# Patient Record
Sex: FEMALE | Race: WHITE | NOT HISPANIC OR LATINO | URBAN - METROPOLITAN AREA
[De-identification: names, ages, dates, MRNs, and addresses within clinical notes are randomized per-mention and may not be internally consistent; named-entity substitution may affect disease eponyms.]

---

## 2017-10-06 ENCOUNTER — INPATIENT (INPATIENT)
Facility: HOSPITAL | Age: 82
LOS: 2 days | Discharge: HOME | DRG: 310 | End: 2017-10-09
Attending: INTERNAL MEDICINE | Admitting: INTERNAL MEDICINE
Payer: MEDICARE

## 2017-10-06 VITALS
DIASTOLIC BLOOD PRESSURE: 92 MMHG | WEIGHT: 149.91 LBS | HEIGHT: 63 IN | OXYGEN SATURATION: 96 % | HEART RATE: 131 BPM | RESPIRATION RATE: 18 BRPM | SYSTOLIC BLOOD PRESSURE: 154 MMHG | TEMPERATURE: 98 F

## 2017-10-07 DIAGNOSIS — I48.91 UNSPECIFIED ATRIAL FIBRILLATION: ICD-10-CM

## 2017-10-07 DIAGNOSIS — E78.00 PURE HYPERCHOLESTEROLEMIA, UNSPECIFIED: ICD-10-CM

## 2017-10-07 DIAGNOSIS — Z29.9 ENCOUNTER FOR PROPHYLACTIC MEASURES, UNSPECIFIED: ICD-10-CM

## 2017-10-07 DIAGNOSIS — I10 ESSENTIAL (PRIMARY) HYPERTENSION: ICD-10-CM

## 2017-10-07 LAB
ALBUMIN SERPL ELPH-MCNC: 3.7 G/DL — SIGNIFICANT CHANGE UP (ref 3.5–5)
ALP SERPL-CCNC: 88 U/L — SIGNIFICANT CHANGE UP (ref 40–120)
ALT FLD-CCNC: 29 U/L DA — SIGNIFICANT CHANGE UP (ref 10–60)
ANION GAP SERPL CALC-SCNC: 7 MMOL/L — SIGNIFICANT CHANGE UP (ref 5–17)
ANION GAP SERPL CALC-SCNC: 9 MMOL/L — SIGNIFICANT CHANGE UP (ref 5–17)
APPEARANCE UR: CLEAR — SIGNIFICANT CHANGE UP
APTT BLD: 113.7 SEC — HIGH (ref 27.5–37.4)
APTT BLD: 182.4 SEC — CRITICAL HIGH (ref 27.5–37.4)
APTT BLD: 28.8 SEC — SIGNIFICANT CHANGE UP (ref 27.5–37.4)
AST SERPL-CCNC: 20 U/L — SIGNIFICANT CHANGE UP (ref 10–40)
BASOPHILS # BLD AUTO: 0.1 K/UL — SIGNIFICANT CHANGE UP (ref 0–0.2)
BASOPHILS NFR BLD AUTO: 0.9 % — SIGNIFICANT CHANGE UP (ref 0–2)
BILIRUB SERPL-MCNC: 0.4 MG/DL — SIGNIFICANT CHANGE UP (ref 0.2–1.2)
BILIRUB UR-MCNC: NEGATIVE — SIGNIFICANT CHANGE UP
BUN SERPL-MCNC: 13 MG/DL — SIGNIFICANT CHANGE UP (ref 7–18)
BUN SERPL-MCNC: 22 MG/DL — HIGH (ref 7–18)
CALCIUM SERPL-MCNC: 8.9 MG/DL — SIGNIFICANT CHANGE UP (ref 8.4–10.5)
CALCIUM SERPL-MCNC: 9.2 MG/DL — SIGNIFICANT CHANGE UP (ref 8.4–10.5)
CHLORIDE SERPL-SCNC: 105 MMOL/L — SIGNIFICANT CHANGE UP (ref 96–108)
CHLORIDE SERPL-SCNC: 113 MMOL/L — HIGH (ref 96–108)
CHOLEST SERPL-MCNC: 197 MG/DL — SIGNIFICANT CHANGE UP (ref 10–199)
CK MB BLD-MCNC: 2 % — SIGNIFICANT CHANGE UP (ref 0–3.5)
CK MB BLD-MCNC: 2.1 % — SIGNIFICANT CHANGE UP (ref 0–3.5)
CK MB CFR SERPL CALC: 2.4 NG/ML — SIGNIFICANT CHANGE UP (ref 0–3.6)
CK MB CFR SERPL CALC: 2.6 NG/ML — SIGNIFICANT CHANGE UP (ref 0–3.6)
CK SERPL-CCNC: 114 U/L — SIGNIFICANT CHANGE UP (ref 21–215)
CK SERPL-CCNC: 132 U/L — SIGNIFICANT CHANGE UP (ref 21–215)
CO2 SERPL-SCNC: 25 MMOL/L — SIGNIFICANT CHANGE UP (ref 22–31)
CO2 SERPL-SCNC: 27 MMOL/L — SIGNIFICANT CHANGE UP (ref 22–31)
COLOR SPEC: YELLOW — SIGNIFICANT CHANGE UP
CREAT SERPL-MCNC: 0.75 MG/DL — SIGNIFICANT CHANGE UP (ref 0.5–1.3)
CREAT SERPL-MCNC: 1.13 MG/DL — SIGNIFICANT CHANGE UP (ref 0.5–1.3)
D DIMER BLD IA.RAPID-MCNC: 216 NG/ML DDU — SIGNIFICANT CHANGE UP
DIFF PNL FLD: NEGATIVE — SIGNIFICANT CHANGE UP
EOSINOPHIL # BLD AUTO: 0.1 K/UL — SIGNIFICANT CHANGE UP (ref 0–0.5)
EOSINOPHIL NFR BLD AUTO: 0.8 % — SIGNIFICANT CHANGE UP (ref 0–6)
GLUCOSE SERPL-MCNC: 110 MG/DL — HIGH (ref 70–99)
GLUCOSE SERPL-MCNC: 116 MG/DL — HIGH (ref 70–99)
GLUCOSE UR QL: NEGATIVE — SIGNIFICANT CHANGE UP
HBA1C BLD-MCNC: 5.5 % — SIGNIFICANT CHANGE UP (ref 4–5.6)
HCT VFR BLD CALC: 45.9 % — HIGH (ref 34.5–45)
HCT VFR BLD CALC: 47.2 % — HIGH (ref 34.5–45)
HCT VFR BLD CALC: 47.8 % — HIGH (ref 34.5–45)
HDLC SERPL-MCNC: 62 MG/DL — SIGNIFICANT CHANGE UP (ref 40–125)
HGB BLD-MCNC: 14.3 G/DL — SIGNIFICANT CHANGE UP (ref 11.5–15.5)
HGB BLD-MCNC: 14.7 G/DL — SIGNIFICANT CHANGE UP (ref 11.5–15.5)
HGB BLD-MCNC: 14.9 G/DL — SIGNIFICANT CHANGE UP (ref 11.5–15.5)
INR BLD: 0.94 RATIO — SIGNIFICANT CHANGE UP (ref 0.88–1.16)
KETONES UR-MCNC: NEGATIVE — SIGNIFICANT CHANGE UP
LEUKOCYTE ESTERASE UR-ACNC: ABNORMAL
LIPID PNL WITH DIRECT LDL SERPL: 116 MG/DL — SIGNIFICANT CHANGE UP
LYMPHOCYTES # BLD AUTO: 2.9 K/UL — SIGNIFICANT CHANGE UP (ref 1–3.3)
LYMPHOCYTES # BLD AUTO: 27.4 % — SIGNIFICANT CHANGE UP (ref 13–44)
MAGNESIUM SERPL-MCNC: 2.1 MG/DL — SIGNIFICANT CHANGE UP (ref 1.6–2.6)
MAGNESIUM SERPL-MCNC: 2.2 MG/DL — SIGNIFICANT CHANGE UP (ref 1.6–2.6)
MCHC RBC-ENTMCNC: 28.6 PG — SIGNIFICANT CHANGE UP (ref 27–34)
MCHC RBC-ENTMCNC: 29 PG — SIGNIFICANT CHANGE UP (ref 27–34)
MCHC RBC-ENTMCNC: 29.4 PG — SIGNIFICANT CHANGE UP (ref 27–34)
MCHC RBC-ENTMCNC: 30.8 GM/DL — LOW (ref 32–36)
MCHC RBC-ENTMCNC: 31.1 GM/DL — LOW (ref 32–36)
MCHC RBC-ENTMCNC: 31.5 GM/DL — LOW (ref 32–36)
MCV RBC AUTO: 93.1 FL — SIGNIFICANT CHANGE UP (ref 80–100)
MCV RBC AUTO: 93.1 FL — SIGNIFICANT CHANGE UP (ref 80–100)
MCV RBC AUTO: 93.4 FL — SIGNIFICANT CHANGE UP (ref 80–100)
MONOCYTES # BLD AUTO: 0.8 K/UL — SIGNIFICANT CHANGE UP (ref 0–0.9)
MONOCYTES NFR BLD AUTO: 7.5 % — SIGNIFICANT CHANGE UP (ref 2–14)
NEUTROPHILS # BLD AUTO: 6.7 K/UL — SIGNIFICANT CHANGE UP (ref 1.8–7.4)
NEUTROPHILS NFR BLD AUTO: 63.4 % — SIGNIFICANT CHANGE UP (ref 43–77)
NITRITE UR-MCNC: NEGATIVE — SIGNIFICANT CHANGE UP
NT-PROBNP SERPL-SCNC: 2829 PG/ML — HIGH (ref 0–450)
PH UR: 6.5 — SIGNIFICANT CHANGE UP (ref 5–8)
PHOSPHATE SERPL-MCNC: 2.8 MG/DL — SIGNIFICANT CHANGE UP (ref 2.5–4.5)
PHOSPHATE SERPL-MCNC: 3.3 MG/DL — SIGNIFICANT CHANGE UP (ref 2.5–4.5)
PLATELET # BLD AUTO: 239 K/UL — SIGNIFICANT CHANGE UP (ref 150–400)
PLATELET # BLD AUTO: 256 K/UL — SIGNIFICANT CHANGE UP (ref 150–400)
PLATELET # BLD AUTO: 271 K/UL — SIGNIFICANT CHANGE UP (ref 150–400)
POTASSIUM SERPL-MCNC: 4.4 MMOL/L — SIGNIFICANT CHANGE UP (ref 3.5–5.3)
POTASSIUM SERPL-MCNC: 4.5 MMOL/L — SIGNIFICANT CHANGE UP (ref 3.5–5.3)
POTASSIUM SERPL-SCNC: 4.4 MMOL/L — SIGNIFICANT CHANGE UP (ref 3.5–5.3)
POTASSIUM SERPL-SCNC: 4.5 MMOL/L — SIGNIFICANT CHANGE UP (ref 3.5–5.3)
PROT SERPL-MCNC: 7.4 G/DL — SIGNIFICANT CHANGE UP (ref 6–8.3)
PROT UR-MCNC: NEGATIVE — SIGNIFICANT CHANGE UP
PROTHROM AB SERPL-ACNC: 10.2 SEC — SIGNIFICANT CHANGE UP (ref 9.8–12.7)
RBC # BLD: 4.93 M/UL — SIGNIFICANT CHANGE UP (ref 3.8–5.2)
RBC # BLD: 5.05 M/UL — SIGNIFICANT CHANGE UP (ref 3.8–5.2)
RBC # BLD: 5.13 M/UL — SIGNIFICANT CHANGE UP (ref 3.8–5.2)
RBC # FLD: 12.3 % — SIGNIFICANT CHANGE UP (ref 10.3–14.5)
RBC # FLD: 12.5 % — SIGNIFICANT CHANGE UP (ref 10.3–14.5)
RBC # FLD: 12.5 % — SIGNIFICANT CHANGE UP (ref 10.3–14.5)
SODIUM SERPL-SCNC: 141 MMOL/L — SIGNIFICANT CHANGE UP (ref 135–145)
SODIUM SERPL-SCNC: 145 MMOL/L — SIGNIFICANT CHANGE UP (ref 135–145)
SP GR SPEC: 1.01 — SIGNIFICANT CHANGE UP (ref 1.01–1.02)
T3 SERPL-MCNC: 143 NG/DL — SIGNIFICANT CHANGE UP (ref 80–200)
T4 AB SER-ACNC: 11.1 UG/DL — SIGNIFICANT CHANGE UP (ref 4.6–12)
TOTAL CHOLESTEROL/HDL RATIO MEASUREMENT: 3.2 RATIO — LOW (ref 3.3–7.1)
TRIGL SERPL-MCNC: 95 MG/DL — SIGNIFICANT CHANGE UP (ref 10–149)
TROPONIN I SERPL-MCNC: 0.02 NG/ML — SIGNIFICANT CHANGE UP (ref 0–0.04)
TSH SERPL-MCNC: 2.82 UU/ML — SIGNIFICANT CHANGE UP (ref 0.34–4.82)
UROBILINOGEN FLD QL: NEGATIVE — SIGNIFICANT CHANGE UP
WBC # BLD: 10.6 K/UL — HIGH (ref 3.8–10.5)
WBC # BLD: 8.6 K/UL — SIGNIFICANT CHANGE UP (ref 3.8–10.5)
WBC # BLD: 9.6 K/UL — SIGNIFICANT CHANGE UP (ref 3.8–10.5)
WBC # FLD AUTO: 10.6 K/UL — HIGH (ref 3.8–10.5)
WBC # FLD AUTO: 8.6 K/UL — SIGNIFICANT CHANGE UP (ref 3.8–10.5)
WBC # FLD AUTO: 9.6 K/UL — SIGNIFICANT CHANGE UP (ref 3.8–10.5)

## 2017-10-07 PROCEDURE — 99291 CRITICAL CARE FIRST HOUR: CPT

## 2017-10-07 PROCEDURE — 71010: CPT | Mod: 26

## 2017-10-07 RX ORDER — LINACLOTIDE 145 UG/1
290 CAPSULE, GELATIN COATED ORAL
Qty: 0 | Refills: 0 | Status: DISCONTINUED | OUTPATIENT
Start: 2017-10-07 | End: 2017-10-09

## 2017-10-07 RX ORDER — METOPROLOL TARTRATE 50 MG
5 TABLET ORAL EVERY 6 HOURS
Qty: 0 | Refills: 0 | Status: DISCONTINUED | OUTPATIENT
Start: 2017-10-07 | End: 2017-10-09

## 2017-10-07 RX ORDER — ASPIRIN/CALCIUM CARB/MAGNESIUM 324 MG
81 TABLET ORAL DAILY
Qty: 0 | Refills: 0 | Status: DISCONTINUED | OUTPATIENT
Start: 2017-10-07 | End: 2017-10-09

## 2017-10-07 RX ORDER — ROSUVASTATIN CALCIUM 5 MG/1
0 TABLET ORAL
Qty: 0 | Refills: 0 | COMMUNITY

## 2017-10-07 RX ORDER — HEPARIN SODIUM 5000 [USP'U]/ML
INJECTION INTRAVENOUS; SUBCUTANEOUS
Qty: 25000 | Refills: 0 | Status: DISCONTINUED | OUTPATIENT
Start: 2017-10-07 | End: 2017-10-07

## 2017-10-07 RX ORDER — HEPARIN SODIUM 5000 [USP'U]/ML
900 INJECTION INTRAVENOUS; SUBCUTANEOUS
Qty: 25000 | Refills: 0 | Status: DISCONTINUED | OUTPATIENT
Start: 2017-10-07 | End: 2017-10-08

## 2017-10-07 RX ORDER — SODIUM CHLORIDE 9 MG/ML
500 INJECTION INTRAMUSCULAR; INTRAVENOUS; SUBCUTANEOUS ONCE
Qty: 0 | Refills: 0 | Status: COMPLETED | OUTPATIENT
Start: 2017-10-07 | End: 2017-10-07

## 2017-10-07 RX ORDER — ENOXAPARIN SODIUM 100 MG/ML
68 INJECTION SUBCUTANEOUS ONCE
Qty: 0 | Refills: 0 | Status: COMPLETED | OUTPATIENT
Start: 2017-10-07 | End: 2017-10-07

## 2017-10-07 RX ORDER — ASPIRIN/CALCIUM CARB/MAGNESIUM 324 MG
0 TABLET ORAL
Qty: 0 | Refills: 0 | COMMUNITY

## 2017-10-07 RX ORDER — METOPROLOL TARTRATE 50 MG
25 TABLET ORAL
Qty: 0 | Refills: 0 | Status: DISCONTINUED | OUTPATIENT
Start: 2017-10-07 | End: 2017-10-09

## 2017-10-07 RX ORDER — LINACLOTIDE 145 UG/1
1 CAPSULE, GELATIN COATED ORAL
Qty: 0 | Refills: 0 | COMMUNITY

## 2017-10-07 RX ORDER — LOSARTAN POTASSIUM 100 MG/1
0 TABLET, FILM COATED ORAL
Qty: 0 | Refills: 0 | COMMUNITY

## 2017-10-07 RX ORDER — METOPROLOL TARTRATE 50 MG
5 TABLET ORAL ONCE
Qty: 0 | Refills: 0 | Status: COMPLETED | OUTPATIENT
Start: 2017-10-07 | End: 2017-10-07

## 2017-10-07 RX ORDER — SODIUM CHLORIDE 9 MG/ML
3 INJECTION INTRAMUSCULAR; INTRAVENOUS; SUBCUTANEOUS ONCE
Qty: 0 | Refills: 0 | Status: COMPLETED | OUTPATIENT
Start: 2017-10-07 | End: 2017-10-07

## 2017-10-07 RX ORDER — ATORVASTATIN CALCIUM 80 MG/1
40 TABLET, FILM COATED ORAL AT BEDTIME
Qty: 0 | Refills: 0 | Status: DISCONTINUED | OUTPATIENT
Start: 2017-10-07 | End: 2017-10-09

## 2017-10-07 RX ORDER — INFLUENZA VIRUS VACCINE 15; 15; 15; 15 UG/.5ML; UG/.5ML; UG/.5ML; UG/.5ML
0.5 SUSPENSION INTRAMUSCULAR ONCE
Qty: 0 | Refills: 0 | Status: COMPLETED | OUTPATIENT
Start: 2017-10-07 | End: 2017-10-09

## 2017-10-07 RX ADMIN — SODIUM CHLORIDE 1000 MILLILITER(S): 9 INJECTION INTRAMUSCULAR; INTRAVENOUS; SUBCUTANEOUS at 00:34

## 2017-10-07 RX ADMIN — HEPARIN SODIUM 900 UNIT(S)/HR: 5000 INJECTION INTRAVENOUS; SUBCUTANEOUS at 19:54

## 2017-10-07 RX ADMIN — Medication 25 MILLIGRAM(S): at 05:00

## 2017-10-07 RX ADMIN — HEPARIN SODIUM 1100 UNIT(S)/HR: 5000 INJECTION INTRAVENOUS; SUBCUTANEOUS at 11:01

## 2017-10-07 RX ADMIN — ENOXAPARIN SODIUM 68 MILLIGRAM(S): 100 INJECTION SUBCUTANEOUS at 02:11

## 2017-10-07 RX ADMIN — Medication 5 MILLIGRAM(S): at 19:09

## 2017-10-07 RX ADMIN — SODIUM CHLORIDE 3 MILLILITER(S): 9 INJECTION INTRAMUSCULAR; INTRAVENOUS; SUBCUTANEOUS at 00:19

## 2017-10-07 RX ADMIN — HEPARIN SODIUM 0 UNIT(S)/HR: 5000 INJECTION INTRAVENOUS; SUBCUTANEOUS at 18:14

## 2017-10-07 RX ADMIN — HEPARIN SODIUM 1200 UNIT(S)/HR: 5000 INJECTION INTRAVENOUS; SUBCUTANEOUS at 04:55

## 2017-10-07 RX ADMIN — ATORVASTATIN CALCIUM 40 MILLIGRAM(S): 80 TABLET, FILM COATED ORAL at 21:49

## 2017-10-07 RX ADMIN — Medication 25 MILLIGRAM(S): at 17:26

## 2017-10-07 RX ADMIN — Medication 81 MILLIGRAM(S): at 11:02

## 2017-10-07 NOTE — ED PROVIDER NOTE - CRITICAL CARE PROVIDED
direct patient care (not related to procedure)/documentation additional history taking/consult w/ pt's family directly relating to pts condition/documentation/direct patient care (not related to procedure)/interpretation of diagnostic studies

## 2017-10-07 NOTE — H&P ADULT - PROBLEM SELECTOR PLAN 3
-Check lipid panel  -Starting Lipitor 40mg, patient -Check lipid panel  -Starting Lipitor 40mg, patient is unsure about the home lipitor dose.

## 2017-10-07 NOTE — PATIENT PROFILE ADULT. - LANGUAGE ASSISTANCE NEEDED
PT prefer daughter will interpret for her/No-Patient/Caregiver offered and refused free interpretation services.

## 2017-10-07 NOTE — CONSULT NOTE ADULT - SUBJECTIVE AND OBJECTIVE BOX
Requesting Physician : Dr. Colon     Reason for Consultation: Afib    HISTORY OF PRESENT ILLNESS:  83 yo F with h/o HTN, HLD who is being seen for afib.  The patient was admitted after complaining of palpitations for 2 days.  Upon admission, the patient was found to be in new onset AF and admitted to telemetry.  She denies chest pain, sob, or syncope.  She was placed on hep gtt for afib.  Cardiology consulted to further evaluate.  No recent cardiac workup.  Her last PMD visit was 4 months ago and was wnl per patient.     In ED, patient was tachycardic to 145, EKG showed new onset A flutter with HR of 132. s/p 20mg IV cardizem x2 times, PO 30mg cardizem once, HR subsided to 90's. SBP at 110-130's. Patient is admitted to the telemetry floor for new onset A flutter/fib requiring rate control and anticoagulation(CHADSVASC score 4). (07 Oct 2017 04:47)      PAST MEDICAL & SURGICAL HISTORY:  Hypercholesterolemia  HTN (hypertension)  No significant past surgical history          MEDICATIONS:  MEDICATIONS  (STANDING):  aspirin  chewable 81 milliGRAM(s) Oral daily  atorvastatin 40 milliGRAM(s) Oral at bedtime  heparin  Infusion.  Unit(s)/Hr (12 mL/Hr) IV Continuous <Continuous>  metoprolol 25 milliGRAM(s) Oral two times a day      Allergies    No Known Allergies    Intolerances        FAMILY HISTORY:  No pertinent family history in first degree relatives    Non-contributary for premature coronary disease or sudden cardiac death    SOCIAL HISTORY:    [ ] Non-smoker  [ ] Smoker  [ ] Alcohol      REVIEW OF SYSTEMS:  [ ]chest pain  [  ]shortness of breath  [ x ]palpitations  [  ]syncope  [ ]near syncope [ ]upper extremity weakness   [ ] lower extremity weakness  [  ]diplopia  [  ]altered mental status   [  ]fevers  [ ]chills [ ]nausea  [ ]vomitting  [  ]dysphagia    [ ]abdominal pain  [ ]melena  [ ]BRBPR    [  ]epistaxis  [  ]rash    [ ]lower extremity edema        [x ] All others negative	  [ ] Unable to obtain    PHYSICAL EXAM:  T(C): 36.8 (10-07-17 @ 07:48), Max: 36.9 (10-07-17 @ 06:12)  HR: 102 (10-07-17 @ 07:48) (89 - 145)  BP: 127/56 (10-07-17 @ 07:48) (112/70 - 154/92)  RR: 18 (10-07-17 @ 07:48) (18 - 19)  SpO2: 99% (10-07-17 @ 07:48) (96% - 99%)  Wt(kg): --  I&O's Summary          Lymphatic: No lymphadenopathy , no edema  Cardiovascular: Normal S1 S2, IRRR No JVD, No murmurs , Peripheral pulses palpable 2+ bilaterally  Respiratory: Lungs clear to auscultation, normal effort 	  Gastrointestinal:  Soft, Non-tender, + BS	  Skin: No rashes, No ecchymoses, No cyanosis, warm to touch  Musculoskeletal: Normal range of motion, normal strength      TELEMETRY: AF	    ECG:  AF	  RADIOLOGY:  OTHER:     DIAGNOSTIC TESTING:  [ ] Echocardiogram:  [ ]  Catheterization:  [ ] Stress Test:    	  	  LABS:	 	    CARDIAC MARKERS:  CARDIAC MARKERS ( 07 Oct 2017 10:04 )  0.016 ng/mL / x     / 132 U/L / x     / 2.6 ng/mL  CARDIAC MARKERS ( 07 Oct 2017 00:19 )  0.018 ng/mL / x     / x     / x     / x                                  14.3   8.6   )-----------( 239      ( 07 Oct 2017 10:04 )             45.9     10-07    145  |  113<H>  |  13  ----------------------------<  110<H>  4.4   |  25  |  0.75    Ca    8.9      07 Oct 2017 10:04  Phos  2.8     10-07  Mg     2.2     10-07    TPro  7.4  /  Alb  3.7  /  TBili  0.4  /  DBili  x   /  AST  20  /  ALT  29  /  AlkPhos  88  10-07    proBNP: Serum Pro-Brain Natriuretic Peptide: 2829 pg/mL (10-07 @ 00:19)    Lipid Profile:   HgA1c:   TSH: Thyroid Stimulating Hormone, Serum: 2.82 uU/mL (10-07 @ 00:19)      ASSESSMENT/PLAN: 	82yFemale with HTN, HLD who is being seen for new onset afib.   -Admit to tele  -Check TTE  -Recommend lifelong ac for cva prevention given elevated CHADS-vasc score  -Continue with rate control with metoprolol  -Further recommendations including candidacy for NOAC pending above    Darío Sequeira MD  [facility]  [address], [address]  [place] [address]  office: [phone]  pager: [phone]

## 2017-10-07 NOTE — H&P ADULT - PROBLEM SELECTOR PLAN 1
-[id number] score 4, will need full AC, starting heparin drip +  aspirin  -D-dimer 216, PE less likely; for tachycardia  -Monitor telemetry, trend cardiac enzymes(1st set negative)  -Cardiology consult  -check TTE  -Starting beta blocker for rate control; adjust dose based on the HR.   -Monitor BP and HR -[id number] score 4, will need full AC, starting heparin drip +  aspirin  -D-dimer 216, PE less likely; for tachycardia  -Monitor telemetry, trend cardiac enzymes(1st set negative)  -Cardiology consult  -check TTE  -Starting beta blocker for rate control; adjust dose based on the HR.   -Monitor BP and HR  -Cardio Dr. Delgadillo

## 2017-10-07 NOTE — ED PROVIDER NOTE - OBJECTIVE STATEMENT
83 y/o female with significant PMHx of HTN, HLD,  presents to the ED c/o palpations onset x 2 days. Pt reports associated neck pain. Pt reports mild dizziness. Pt relates she drinks 1 small cup of coffee in the mornings, and 3 days ago had consumed small amount of ETOH. Pt drinks infrequently, no drug use. Pt denies recent sick contacts, recent travel, long periods of immobilization or Hx of DVT/PE/CHF. Pt denies CP, SOB, leg swelling, Hx of palpations in the past, or any other complaints.

## 2017-10-07 NOTE — H&P ADULT - ASSESSMENT
Patient is a 82y old  Female who presents with a chief complaint of palpitation, is admitted to the telemetry floor for new onset A flutter/fib requiring rate control and anticoagulation(CHADSVASC score 4).

## 2017-10-07 NOTE — ED ADULT NURSE REASSESSMENT NOTE - NS ED NURSE REASSESS COMMENT FT1
pt awake alert oriented x 3 not in distress,with saline lock intact hooked to cardiac monitor on flutter ,seen by mar. pt awake alert oriented x 3 not in distress,with saline lock intact hooked to cardiac monitor on a-fibr ,seen by mar.

## 2017-10-07 NOTE — H&P ADULT - HISTORY OF PRESENT ILLNESS
82 years old female from home lives alone, walks independently, no HHA, with PMH of HTN, HLD, and ?unknown tachyarrhythmia(however denies having h/o Afib) presented to the ED c/o palpations onset 2 days ago. Also endorses associated mild dizziness. Also notes, 3 days ago she drank small amount of ETOH however she does not consume EtOH daily. Denies chest pain, SOB, leg swelling, fever, chills, diarrhea, abdominal pain, or any other symptoms. Today she checked her BP and HR because of the symptoms, BP was 180/110 and HR was 153 so she decided to seek medical help. Last PMD check up was 4 months ago, all WNL per patient.     In ED, patient was tachycardic to 145, EKG showed new onset A flutter with HR of 132. s/p 20mg IV cardizem x2 times, PO 30mg cardizem once, HR subsided to 90's. SBP at 110-130's. Patient is admitted to the telemetry floor for new onset A flutter/fib requiring rate control and anticoagulation(CHADSVASC score 4). 82 years old Belgian speaking female from home lives alone, walks independently, no HHA, with PMH of HTN, HLD, and ?unknown tachyarrhythmia(however denies having h/o Afib) presented to the ED c/o palpations onset 2 days ago. Also endorses associated mild dizziness. Also notes, 3 days ago she drank small amount of ETOH however she does not consume EtOH daily. Denies chest pain, SOB, leg swelling, fever, chills, diarrhea, abdominal pain, or any other symptoms. Today she checked her BP and HR because of the symptoms, BP was 180/110 and HR was 153 so she decided to seek medical help. Last PMD check up was 4 months ago, all WNL per patient.     In ED, patient was tachycardic to 145, EKG showed new onset A flutter with HR of 132. s/p 20mg IV cardizem x2 times, PO 30mg cardizem once, HR subsided to 90's. SBP at 110-130's. Patient is admitted to the telemetry floor for new onset A flutter/fib requiring rate control and anticoagulation(CHADSVASC score 4).

## 2017-10-07 NOTE — ED PROVIDER NOTE - MEDICAL DECISION MAKING DETAILS
Pt p/w palpitations x 2 days. EKG shows Atrial flutter, will obtain labs, CXR, given IV cardizem, 500 cc normal saline. Will reassess.

## 2017-10-07 NOTE — H&P ADULT - NSHPPHYSICALEXAM_GEN_ALL_CORE
T(C): 36.8 (10-06-17 @ 23:55), Max: 36.8 (10-06-17 @ 23:55)  HR: 89 (10-07-17 @ 05:02) (89 - 145)  BP: 129/80 (10-07-17 @ 05:02) (112/70 - 154/92)  RR: 18 (10-07-17 @ 05:02) (18 - 19)  SpO2: 97% (10-07-17 @ 05:02) (96% - 98%)  Wt(kg): --  I&O's Summary    PHYSICAL EXAM:  GENERAL: NAD, well-groomed, well-developed  HEAD:  Atraumatic, Normocephalic  EYES: EOMI, PERRLA, conjunctiva and sclera clear  ENMT: No tonsillar erythema, exudates, or enlargement; Moist mucous membranes, Good dentition, No lesions  NECK: Supple, No JVD, Normal thyroid  NERVOUS SYSTEM:  Alert & Oriented X3, Motor Strength 5/5 B/L upper and lower extremities.  CHEST/LUNG: Clear to percussion bilaterally; No rales, rhonchi, wheezing, or rubs  HEART: Irregular rate and rhythm; No murmurs, rubs, or gallops  ABDOMEN: Soft, Nontender, Nondistended; Bowel sounds present  EXTREMITIES:  2+ Peripheral Pulses bilaterally, No clubbing, cyanosis, or edema  LYMPH: No lymphadenopathy noted  SKIN: No rashes or lesions

## 2017-10-07 NOTE — ED PROVIDER NOTE - PROGRESS NOTE DETAILS
labs unremarkable, trop and ddimer wnl, UA neg, CXR unremarkable  after cardizem 20mg IV x2 and 30mg po HR improved to 90s, still in Afib/flutter  given Lovenox for anticoagulation  patient stable for admission for new onset Afib

## 2017-10-07 NOTE — H&P ADULT - NSHPLABSRESULTS_GEN_ALL_CORE
LABS:                        14.9   10.6  )-----------( 271      ( 07 Oct 2017 00:19 )             47.2     10    141  |  105  |  22<H>  ----------------------------<  116<H>  4.5   |  27  |  1.13    Ca    9.2      07 Oct 2017 00:19  Phos  3.3     10-07  Mg     2.1     10-07    TPro  7.4  /  Alb  3.7  /  TBili  0.4  /  DBili  x   /  AST  20  /  ALT  29  /  AlkPhos  88  10-07    PT/INR - ( 07 Oct 2017 00:19 )   PT: 10.2 sec;   INR: 0.94 ratio         PTT - ( 07 Oct 2017 00:19 )  PTT:28.8 sec  Urinalysis Basic - ( 07 Oct 2017 01:36 )    Color: Yellow / Appearance: Clear / S.010 / pH: x  Gluc: x / Ketone: Negative  / Bili: Negative / Urobili: Negative   Blood: x / Protein: Negative / Nitrite: Negative   Leuk Esterase: Trace / RBC: 0-2 /HPF / WBC 0-2 /HPF   Sq Epi: x / Non Sq Epi: Occasional / Bacteria: x      CAPILLARY BLOOD GLUCOSE        LIVER FUNCTIONS - ( 07 Oct 2017 00:19 )  Alb: 3.7 g/dL / Pro: 7.4 g/dL / ALK PHOS: 88 U/L / ALT: 29 U/L DA / AST: 20 U/L / GGT: x             CARDIAC MARKERS ( 07 Oct 2017 00:19 )  0.018 ng/mL / x     / x     / x     / x    EKG: A flutter with 132 BPM

## 2017-10-08 LAB
ANION GAP SERPL CALC-SCNC: 9 MMOL/L — SIGNIFICANT CHANGE UP (ref 5–17)
APTT BLD: 114 SEC — HIGH (ref 27.5–37.4)
APTT BLD: 119.2 SEC — HIGH (ref 27.5–37.4)
APTT BLD: 84.7 SEC — HIGH (ref 27.5–37.4)
BUN SERPL-MCNC: 15 MG/DL — SIGNIFICANT CHANGE UP (ref 7–18)
CALCIUM SERPL-MCNC: 9.1 MG/DL — SIGNIFICANT CHANGE UP (ref 8.4–10.5)
CHLORIDE SERPL-SCNC: 109 MMOL/L — HIGH (ref 96–108)
CO2 SERPL-SCNC: 21 MMOL/L — LOW (ref 22–31)
CREAT SERPL-MCNC: 0.82 MG/DL — SIGNIFICANT CHANGE UP (ref 0.5–1.3)
GLUCOSE SERPL-MCNC: 141 MG/DL — HIGH (ref 70–99)
HCT VFR BLD CALC: 45.8 % — HIGH (ref 34.5–45)
HCT VFR BLD CALC: 47.6 % — HIGH (ref 34.5–45)
HGB BLD-MCNC: 14.9 G/DL — SIGNIFICANT CHANGE UP (ref 11.5–15.5)
HGB BLD-MCNC: 15.3 G/DL — SIGNIFICANT CHANGE UP (ref 11.5–15.5)
MAGNESIUM SERPL-MCNC: 2.3 MG/DL — SIGNIFICANT CHANGE UP (ref 1.6–2.6)
MCHC RBC-ENTMCNC: 30 PG — SIGNIFICANT CHANGE UP (ref 27–34)
MCHC RBC-ENTMCNC: 30.6 PG — SIGNIFICANT CHANGE UP (ref 27–34)
MCHC RBC-ENTMCNC: 32.2 GM/DL — SIGNIFICANT CHANGE UP (ref 32–36)
MCHC RBC-ENTMCNC: 32.5 GM/DL — SIGNIFICANT CHANGE UP (ref 32–36)
MCV RBC AUTO: 93.3 FL — SIGNIFICANT CHANGE UP (ref 80–100)
MCV RBC AUTO: 94.1 FL — SIGNIFICANT CHANGE UP (ref 80–100)
PHOSPHATE SERPL-MCNC: 3.3 MG/DL — SIGNIFICANT CHANGE UP (ref 2.5–4.5)
PLATELET # BLD AUTO: 245 K/UL — SIGNIFICANT CHANGE UP (ref 150–400)
PLATELET # BLD AUTO: 261 K/UL — SIGNIFICANT CHANGE UP (ref 150–400)
POTASSIUM SERPL-MCNC: 3.8 MMOL/L — SIGNIFICANT CHANGE UP (ref 3.5–5.3)
POTASSIUM SERPL-SCNC: 3.8 MMOL/L — SIGNIFICANT CHANGE UP (ref 3.5–5.3)
RBC # BLD: 4.87 M/UL — SIGNIFICANT CHANGE UP (ref 3.8–5.2)
RBC # BLD: 5.1 M/UL — SIGNIFICANT CHANGE UP (ref 3.8–5.2)
RBC # FLD: 12.5 % — SIGNIFICANT CHANGE UP (ref 10.3–14.5)
RBC # FLD: 12.5 % — SIGNIFICANT CHANGE UP (ref 10.3–14.5)
SODIUM SERPL-SCNC: 139 MMOL/L — SIGNIFICANT CHANGE UP (ref 135–145)
WBC # BLD: 11.9 K/UL — HIGH (ref 3.8–10.5)
WBC # BLD: 8.6 K/UL — SIGNIFICANT CHANGE UP (ref 3.8–10.5)
WBC # FLD AUTO: 11.9 K/UL — HIGH (ref 3.8–10.5)
WBC # FLD AUTO: 8.6 K/UL — SIGNIFICANT CHANGE UP (ref 3.8–10.5)

## 2017-10-08 RX ORDER — SODIUM CHLORIDE 9 MG/ML
500 INJECTION INTRAMUSCULAR; INTRAVENOUS; SUBCUTANEOUS ONCE
Qty: 0 | Refills: 0 | Status: COMPLETED | OUTPATIENT
Start: 2017-10-08 | End: 2017-10-08

## 2017-10-08 RX ORDER — ENOXAPARIN SODIUM 100 MG/ML
60 INJECTION SUBCUTANEOUS EVERY 12 HOURS
Qty: 0 | Refills: 0 | Status: DISCONTINUED | OUTPATIENT
Start: 2017-10-08 | End: 2017-10-09

## 2017-10-08 RX ADMIN — HEPARIN SODIUM 900 UNIT(S)/HR: 5000 INJECTION INTRAVENOUS; SUBCUTANEOUS at 03:32

## 2017-10-08 RX ADMIN — HEPARIN SODIUM 800 UNIT(S)/HR: 5000 INJECTION INTRAVENOUS; SUBCUTANEOUS at 10:26

## 2017-10-08 RX ADMIN — Medication 81 MILLIGRAM(S): at 11:50

## 2017-10-08 RX ADMIN — ENOXAPARIN SODIUM 60 MILLIGRAM(S): 100 INJECTION SUBCUTANEOUS at 18:40

## 2017-10-08 RX ADMIN — Medication 25 MILLIGRAM(S): at 06:16

## 2017-10-08 RX ADMIN — SODIUM CHLORIDE 1000 MILLILITER(S): 9 INJECTION INTRAMUSCULAR; INTRAVENOUS; SUBCUTANEOUS at 17:49

## 2017-10-08 RX ADMIN — Medication 5 MILLIGRAM(S): at 09:05

## 2017-10-08 RX ADMIN — ATORVASTATIN CALCIUM 40 MILLIGRAM(S): 80 TABLET, FILM COATED ORAL at 21:49

## 2017-10-08 RX ADMIN — LINACLOTIDE 290 MICROGRAM(S): 145 CAPSULE, GELATIN COATED ORAL at 06:15

## 2017-10-08 NOTE — PROGRESS NOTE ADULT - ATTENDING COMMENTS
Patient seen and examined, agree with above assessment and plan as transcribed above.    - Awaiting echo  - Cont heparin gtt    Ayaan Delgadillo MD, FACC  [facility], [facility]  [address] [place] [address]  PHONE:  [phone]  BEEPER : [phone]

## 2017-10-08 NOTE — PROGRESS NOTE ADULT - SUBJECTIVE AND OBJECTIVE BOX
Patient is a 82y old  Female who presents with a chief complaint of Palpitation onset 2 days ago (07 Oct 2017 04:47)    PATIENT IS SEEN AND EXAMINED IN MEDICAL FLOOR.    ALLERGIES:  No Known Allergies    Daily Weight in k.4 (08 Oct 2017 04:50)    VITALS:    Vital Signs Last 24 Hrs  T(C): 37 (08 Oct 2017 11:44), Max: 37 (07 Oct 2017 15:00)  T(F): 98.6 (08 Oct 2017 11:44), Max: 98.6 (07 Oct 2017 15:00)  HR: 86 (08 Oct 2017 11:44) (50 - 86)  BP: 132/66 (08 Oct 2017 11:44) (108/71 - 145/74)  BP(mean): --  RR: 18 (08 Oct 2017 11:44) (16 - 19)  SpO2: 98% (08 Oct 2017 11:44) (95% - 100%)    LABS:  CBC Full  -  ( 08 Oct 2017 08:36 )  WBC Count : 8.6 K/uL  Hemoglobin : 15.3 g/dL  Hematocrit : 47.6 %  Platelet Count - Automated : 261 K/uL  Mean Cell Volume : 93.3 fl  Mean Cell Hemoglobin : 30.0 pg  Mean Cell Hemoglobin Concentration : 32.2 gm/dL  Auto Neutrophil # : x  Auto Lymphocyte # : x  Auto Monocyte # : x  Auto Eosinophil # : x  Auto Basophil # : x  Auto Neutrophil % : x  Auto Lymphocyte % : x  Auto Monocyte % : x  Auto Eosinophil % : x  Auto Basophil % : x    PT/INR - ( 07 Oct 2017 00:19 )   PT: 10.2 sec;   INR: 0.94 ratio         PTT - ( 08 Oct 2017 09:18 )  PTT:119.2 sec  10    139  |  109<H>  |  15  ----------------------------<  141<H>  3.8   |  21<L>  |  0.82    Ca    9.1      08 Oct 2017 08:36  Phos  3.3     10-08  Mg     2.3     10-08    TPro  7.4  /  Alb  3.7  /  TBili  0.4  /  DBili  x   /  AST  20  /  ALT  29  /  AlkPhos  88  10-07      CARDIAC MARKERS ( 07 Oct 2017 11:59 )  0.019 ng/mL / x     / 114 U/L / x     / 2.4 ng/mL  CARDIAC MARKERS ( 07 Oct 2017 10:04 )  0.016 ng/mL / x     / 132 U/L / x     / 2.6 ng/mL  CARDIAC MARKERS ( 07 Oct 2017 00:19 )  0.018 ng/mL / x     / x     / x     / x          LIVER FUNCTIONS - ( 07 Oct 2017 00:19 )  Alb: 3.7 g/dL / Pro: 7.4 g/dL / ALK PHOS: 88 U/L / ALT: 29 U/L DA / AST: 20 U/L / GGT: x             MEDICATIONS:    MEDICATIONS  (STANDING):  aspirin  chewable 81 milliGRAM(s) Oral daily  atorvastatin 40 milliGRAM(s) Oral at bedtime  enoxaparin Injectable 60 milliGRAM(s) SubCutaneous every 12 hours  influenza   Vaccine 0.5 milliLiter(s) IntraMuscular once  linaclotide 290 MICROGram(s) Oral before breakfast  metoprolol 25 milliGRAM(s) Oral two times a day      MEDICATIONS  (PRN):  metoprolol Injectable 5 milliGRAM(s) IV Push every 6 hours PRN tachycardia of >120      REVIEW OF SYSTEMS:                           ALL ROS DONE [ X   ]    CONSTITUTIONAL:  LETHARGIC [   ], FEVER [   ], UNRESPONSIVE [   ]  CVS:  CP  [   ], SOB, [   ], PALPITATIONS [   ], DIZZYNESS [   ]  RS: COUGH [   ], SPUTUM [   ]  GI: ABDOMINAL PAIN [   ], NAUSEA [   ], VOMITINGS [   ], DIARRHEA [   ], CONSTIPATION [   ]  :  DYSURIA [   ], NOCTURIA [   ], INCREASED FREQUENCY [   ], DRIBLING [   ],  SKELETAL: PAINFUL JOINTS [   ], SWOLLEN JOINTS [   ], NECK ACHE [   ], LOW BACK ACHE [   ],  SKIN : ULCERS [   ], RASH [   ], ITCHING [   ]  CNS: HEAD ACHE [   ], DOUBLE VISION [   ], BLURRED VISION [   ], AMS / CONFUSION [   ], SEIZURES [   ], SEIZURES [   ], WEAKNESS [   ],TINGLING / NUMBNESS [   ],    PHYSICAL EXAMINATION:  GENERAL APPEARANCE: NO DISTRESS  HEENT:  NO PALLOR, NO  JVD,  NO   NODES, NECK SUPPLE  CVS: S1 +, S2 +,   RS: AEEB,  OCCASIONAL  RALES +,   NO RONCHI  ABD: SOFT, NT, NO, BS +  EXT: NO PE  SKIN: WARM,   SKELETAL:  ROM ACCEPTABLE  CNS:  AAO X 2-3 , NO DEFICITS    RADIOLOGY :      ASSESSMENT :     Atrial fibrillation  Hypercholesterolemia  HTN (hypertension)      PLAN:  - PALPITATIONS , NEW ONSET A.FIB. ON METOPROLOL WITH RATE CONTROL. DCD HEPARIN, ADDED LOVENOX. F/UP TTE AND IF NO VALVULAR LESION, WILL START XARELTO AND DC LOVENOX. AND DC PLAN HOME  - GI PROPHYLAXIS  - DR. RUSSO

## 2017-10-08 NOTE — PROGRESS NOTE ADULT - SUBJECTIVE AND OBJECTIVE BOX
Subjective:  pt seen and examined, no complaints on exam.     aspirin  chewable 81 milliGRAM(s) Oral daily  atorvastatin 40 milliGRAM(s) Oral at bedtime  heparin  Infusion. 900 Unit(s)/Hr IV Continuous <Continuous>  influenza   Vaccine 0.5 milliLiter(s) IntraMuscular once  linaclotide 290 MICROGram(s) Oral before breakfast  metoprolol 25 milliGRAM(s) Oral two times a day  metoprolol Injectable 5 milliGRAM(s) IV Push every 6 hours PRN                            14.9   11.9  )-----------( 245      ( 08 Oct 2017 02:13 )             45.8       Hemoglobin: 14.9 g/dL (10-08 @ 02:13)  Hemoglobin: 14.7 g/dL (10-07 @ 11:59)  Hemoglobin: 14.3 g/dL (10-07 @ 10:04)  Hemoglobin: 14.9 g/dL (10-07 @ 00:19)      10-07    145  |  113<H>  |  13  ----------------------------<  110<H>  4.4   |  25  |  0.75    Ca    8.9      07 Oct 2017 10:04  Phos  2.8     10-07  Mg     2.2     10-07    TPro  7.4  /  Alb  3.7  /  TBili  0.4  /  DBili  x   /  AST  20  /  ALT  29  /  AlkPhos  88  10-07    Creatinine Trend: 0.75<--, 1.13<--    COAGS: PTT - ( 08 Oct 2017 02:13 )  PTT:84.7 sec    CARDIAC MARKERS ( 07 Oct 2017 11:59 )  0.019 ng/mL / x     / 114 U/L / x     / 2.4 ng/mL  CARDIAC MARKERS ( 07 Oct 2017 10:04 )  0.016 ng/mL / x     / 132 U/L / x     / 2.6 ng/mL  CARDIAC MARKERS ( 07 Oct 2017 00:19 )  0.018 ng/mL / x     / x     / x     / x            T(C): 36.9 (10-08-17 @ 04:50), Max: 37 (10-07-17 @ 15:00)  HR: 66 (10-08-17 @ 04:50) (50 - 113)  BP: 118/55 (10-08-17 @ 04:50) (108/71 - 161/99)  RR: 17 (10-08-17 @ 04:50) (16 - 19)  SpO2: 95% (10-08-17 @ 04:50) (95% - 100%)  Wt(kg): --    I&O's Summary    07 Oct 2017 07:01  -  08 Oct 2017 07:00  --------------------------------------------------------  IN: 81 mL / OUT: 0 mL / NET: 81 mL      HEENT:   Normal oral mucosa, PERRL, EOMI	  Lymphatic: No lymphadenopathy , no edema  Cardiovascular: Normal S1 S2, No JVD, No murmurs , Peripheral pulses palpable 2+ bilaterally  Respiratory: Lungs clear to auscultation, normal effort 	  Gastrointestinal:  Soft, Non-tender, + BS	      TELEMETRY: 	  Afib  DIAGNOSTIC TESTING:  [ ] Echocardiogram:   [ ]  Catheterization:  [ ] Stress Test:    OTHER: 	    ASSESSMENT/PLAN: 	82y Female with HTN, HLD who is being seen for new onset afib.     tele stable   echo pending  A/C with heparin gtt   Rate control with BB  Further recommendations including candidacy for NOAC pending above   GI / DVT prophylaxis.   keep K>4, mag >2.0   D/W Dr Delgadillo pt seen and examined, no complaints on exam.     aspirin  chewable 81 milliGRAM(s) Oral daily  atorvastatin 40 milliGRAM(s) Oral at bedtime  heparin  Infusion. 900 Unit(s)/Hr IV Continuous <Continuous>  influenza   Vaccine 0.5 milliLiter(s) IntraMuscular once  linaclotide 290 MICROGram(s) Oral before breakfast  metoprolol 25 milliGRAM(s) Oral two times a day  metoprolol Injectable 5 milliGRAM(s) IV Push every 6 hours PRN                            14.9   11.9  )-----------( 245      ( 08 Oct 2017 02:13 )             45.8       Hemoglobin: 14.9 g/dL (10-08 @ 02:13)  Hemoglobin: 14.7 g/dL (10-07 @ 11:59)  Hemoglobin: 14.3 g/dL (10-07 @ 10:04)  Hemoglobin: 14.9 g/dL (10-07 @ 00:19)      10-07    145  |  113<H>  |  13  ----------------------------<  110<H>  4.4   |  25  |  0.75    Ca    8.9      07 Oct 2017 10:04  Phos  2.8     10-07  Mg     2.2     10-07    TPro  7.4  /  Alb  3.7  /  TBili  0.4  /  DBili  x   /  AST  20  /  ALT  29  /  AlkPhos  88  10-07    Creatinine Trend: 0.75<--, 1.13<--    COAGS: PTT - ( 08 Oct 2017 02:13 )  PTT:84.7 sec    CARDIAC MARKERS ( 07 Oct 2017 11:59 )  0.019 ng/mL / x     / 114 U/L / x     / 2.4 ng/mL  CARDIAC MARKERS ( 07 Oct 2017 10:04 )  0.016 ng/mL / x     / 132 U/L / x     / 2.6 ng/mL  CARDIAC MARKERS ( 07 Oct 2017 00:19 )  0.018 ng/mL / x     / x     / x     / x            T(C): 36.9 (10-08-17 @ 04:50), Max: 37 (10-07-17 @ 15:00)  HR: 66 (10-08-17 @ 04:50) (50 - 113)  BP: 118/55 (10-08-17 @ 04:50) (108/71 - 161/99)  RR: 17 (10-08-17 @ 04:50) (16 - 19)  SpO2: 95% (10-08-17 @ 04:50) (95% - 100%)  Wt(kg): --    I&O's Summary    07 Oct 2017 07:01  -  08 Oct 2017 07:00  --------------------------------------------------------  IN: 81 mL / OUT: 0 mL / NET: 81 mL      HEENT:   Normal oral mucosa, PERRL, EOMI	  Lymphatic: No lymphadenopathy , no edema  Cardiovascular: Normal S1 S2, No JVD, No murmurs , Peripheral pulses palpable 2+ bilaterally  Respiratory: Lungs clear to auscultation, normal effort 	  Gastrointestinal:  Soft, Non-tender, + BS	      TELEMETRY: 	  Afib  	    ASSESSMENT/PLAN: 	82y Female with HTN, HLD who is being seen for new onset afib.     tele stable   echo pending  A/C with heparin gtt   Rate control with BB  Further recommendations including candidacy for NOAC pending above   GI / DVT prophylaxis.   keep K>4, mag >2.0   D/W Dr Delgadillo

## 2017-10-09 VITALS
DIASTOLIC BLOOD PRESSURE: 64 MMHG | SYSTOLIC BLOOD PRESSURE: 101 MMHG | RESPIRATION RATE: 18 BRPM | OXYGEN SATURATION: 99 % | TEMPERATURE: 99 F | HEART RATE: 68 BPM

## 2017-10-09 LAB
ANION GAP SERPL CALC-SCNC: 9 MMOL/L — SIGNIFICANT CHANGE UP (ref 5–17)
APTT BLD: 33.9 SEC — SIGNIFICANT CHANGE UP (ref 27.5–37.4)
BUN SERPL-MCNC: 17 MG/DL — SIGNIFICANT CHANGE UP (ref 7–18)
CALCIUM SERPL-MCNC: 9.2 MG/DL — SIGNIFICANT CHANGE UP (ref 8.4–10.5)
CHLORIDE SERPL-SCNC: 111 MMOL/L — HIGH (ref 96–108)
CO2 SERPL-SCNC: 25 MMOL/L — SIGNIFICANT CHANGE UP (ref 22–31)
CREAT SERPL-MCNC: 0.82 MG/DL — SIGNIFICANT CHANGE UP (ref 0.5–1.3)
GLUCOSE SERPL-MCNC: 105 MG/DL — HIGH (ref 70–99)
HCT VFR BLD CALC: 43 % — SIGNIFICANT CHANGE UP (ref 34.5–45)
HGB BLD-MCNC: 13.9 G/DL — SIGNIFICANT CHANGE UP (ref 11.5–15.5)
MAGNESIUM SERPL-MCNC: 2.3 MG/DL — SIGNIFICANT CHANGE UP (ref 1.6–2.6)
MCHC RBC-ENTMCNC: 30.2 PG — SIGNIFICANT CHANGE UP (ref 27–34)
MCHC RBC-ENTMCNC: 32.3 GM/DL — SIGNIFICANT CHANGE UP (ref 32–36)
MCV RBC AUTO: 93.6 FL — SIGNIFICANT CHANGE UP (ref 80–100)
PHOSPHATE SERPL-MCNC: 3.4 MG/DL — SIGNIFICANT CHANGE UP (ref 2.5–4.5)
PLATELET # BLD AUTO: 237 K/UL — SIGNIFICANT CHANGE UP (ref 150–400)
POTASSIUM SERPL-MCNC: 4.1 MMOL/L — SIGNIFICANT CHANGE UP (ref 3.5–5.3)
POTASSIUM SERPL-SCNC: 4.1 MMOL/L — SIGNIFICANT CHANGE UP (ref 3.5–5.3)
RBC # BLD: 4.59 M/UL — SIGNIFICANT CHANGE UP (ref 3.8–5.2)
RBC # FLD: 12.5 % — SIGNIFICANT CHANGE UP (ref 10.3–14.5)
SODIUM SERPL-SCNC: 145 MMOL/L — SIGNIFICANT CHANGE UP (ref 135–145)
WBC # BLD: 6.5 K/UL — SIGNIFICANT CHANGE UP (ref 3.8–10.5)
WBC # FLD AUTO: 6.5 K/UL — SIGNIFICANT CHANGE UP (ref 3.8–10.5)

## 2017-10-09 PROCEDURE — 82553 CREATINE MB FRACTION: CPT

## 2017-10-09 PROCEDURE — 83735 ASSAY OF MAGNESIUM: CPT

## 2017-10-09 PROCEDURE — 83880 ASSAY OF NATRIURETIC PEPTIDE: CPT

## 2017-10-09 PROCEDURE — 90686 IIV4 VACC NO PRSV 0.5 ML IM: CPT

## 2017-10-09 PROCEDURE — 84480 ASSAY TRIIODOTHYRONINE (T3): CPT

## 2017-10-09 PROCEDURE — 85730 THROMBOPLASTIN TIME PARTIAL: CPT

## 2017-10-09 PROCEDURE — 80061 LIPID PANEL: CPT

## 2017-10-09 PROCEDURE — 84443 ASSAY THYROID STIM HORMONE: CPT

## 2017-10-09 PROCEDURE — 80053 COMPREHEN METABOLIC PANEL: CPT

## 2017-10-09 PROCEDURE — 99291 CRITICAL CARE FIRST HOUR: CPT | Mod: 25

## 2017-10-09 PROCEDURE — 71045 X-RAY EXAM CHEST 1 VIEW: CPT

## 2017-10-09 PROCEDURE — 93306 TTE W/DOPPLER COMPLETE: CPT

## 2017-10-09 PROCEDURE — 83036 HEMOGLOBIN GLYCOSYLATED A1C: CPT

## 2017-10-09 PROCEDURE — 85610 PROTHROMBIN TIME: CPT

## 2017-10-09 PROCEDURE — 80048 BASIC METABOLIC PNL TOTAL CA: CPT

## 2017-10-09 PROCEDURE — 93005 ELECTROCARDIOGRAM TRACING: CPT

## 2017-10-09 PROCEDURE — 84100 ASSAY OF PHOSPHORUS: CPT

## 2017-10-09 PROCEDURE — 82550 ASSAY OF CK (CPK): CPT

## 2017-10-09 PROCEDURE — 85027 COMPLETE CBC AUTOMATED: CPT

## 2017-10-09 PROCEDURE — 84484 ASSAY OF TROPONIN QUANT: CPT

## 2017-10-09 PROCEDURE — 85379 FIBRIN DEGRADATION QUANT: CPT

## 2017-10-09 PROCEDURE — 84436 ASSAY OF TOTAL THYROXINE: CPT

## 2017-10-09 PROCEDURE — 81001 URINALYSIS AUTO W/SCOPE: CPT

## 2017-10-09 RX ORDER — METOPROLOL TARTRATE 50 MG
1 TABLET ORAL
Qty: 14 | Refills: 0 | OUTPATIENT
Start: 2017-10-09 | End: 2017-10-23

## 2017-10-09 RX ORDER — RIVAROXABAN 15 MG-20MG
1 KIT ORAL
Qty: 30 | Refills: 0 | OUTPATIENT
Start: 2017-10-09 | End: 2017-11-08

## 2017-10-09 RX ORDER — ATORVASTATIN CALCIUM 80 MG/1
1 TABLET, FILM COATED ORAL
Qty: 14 | Refills: 0 | OUTPATIENT
Start: 2017-10-09 | End: 2017-10-23

## 2017-10-09 RX ORDER — ATORVASTATIN CALCIUM 80 MG/1
0 TABLET, FILM COATED ORAL
Qty: 0 | Refills: 0 | COMMUNITY

## 2017-10-09 RX ORDER — CARVEDILOL PHOSPHATE 80 MG/1
0 CAPSULE, EXTENDED RELEASE ORAL
Qty: 0 | Refills: 0 | COMMUNITY

## 2017-10-09 RX ADMIN — ENOXAPARIN SODIUM 60 MILLIGRAM(S): 100 INJECTION SUBCUTANEOUS at 06:03

## 2017-10-09 RX ADMIN — INFLUENZA VIRUS VACCINE 0.5 MILLILITER(S): 15; 15; 15; 15 SUSPENSION INTRAMUSCULAR at 12:32

## 2017-10-09 RX ADMIN — Medication 25 MILLIGRAM(S): at 06:02

## 2017-10-09 RX ADMIN — LINACLOTIDE 290 MICROGRAM(S): 145 CAPSULE, GELATIN COATED ORAL at 08:25

## 2017-10-09 RX ADMIN — Medication 81 MILLIGRAM(S): at 12:30

## 2017-10-09 NOTE — PROGRESS NOTE ADULT - SUBJECTIVE AND OBJECTIVE BOX
pt seen and examined, no complaints on exam.   ROS (-)    aspirin  chewable 81 milliGRAM(s) Oral daily  atorvastatin 40 milliGRAM(s) Oral at bedtime  enoxaparin Injectable 60 milliGRAM(s) SubCutaneous every 12 hours  influenza   Vaccine 0.5 milliLiter(s) IntraMuscular once  linaclotide 290 MICROGram(s) Oral before breakfast  metoprolol 25 milliGRAM(s) Oral two times a day  metoprolol Injectable 5 milliGRAM(s) IV Push every 6 hours PRN                            13.9   6.5   )-----------( 237      ( 09 Oct 2017 07:32 )             43.0       Hemoglobin: 13.9 g/dL (10-09 @ 07:32)  Hemoglobin: 15.3 g/dL (10-08 @ 08:36)  Hemoglobin: 14.9 g/dL (10-08 @ 02:13)  Hemoglobin: 14.7 g/dL (10-07 @ 11:59)  Hemoglobin: 14.3 g/dL (10-07 @ 10:04)      10-09    145  |  111<H>  |  17  ----------------------------<  105<H>  4.1   |  25  |  0.82    Ca    9.2      09 Oct 2017 07:32  Phos  3.4     10-09  Mg     2.3     10-09      Creatinine Trend: 0.82<--, 0.82<--, 0.75<--, 1.13<--    COAGS: PTT - ( 09 Oct 2017 07:32 )  PTT:33.9 sec    CARDIAC MARKERS ( 07 Oct 2017 11:59 )  0.019 ng/mL / x     / 114 U/L / x     / 2.4 ng/mL  CARDIAC MARKERS ( 07 Oct 2017 10:04 )  0.016 ng/mL / x     / 132 U/L / x     / 2.6 ng/mL  CARDIAC MARKERS ( 07 Oct 2017 00:19 )  0.018 ng/mL / x     / x     / x     / x            T(C): 36.6 (10-09-17 @ 08:05), Max: 37 (10-08-17 @ 11:44)  HR: 65 (10-09-17 @ 08:05) (53 - 96)  BP: 103/68 (10-09-17 @ 08:05) (96/58 - 132/66)  RR: 16 (10-09-17 @ 08:05) (16 - 18)  SpO2: 99% (10-09-17 @ 08:05) (98% - 100%)  Wt(kg): --    I&O's Summary    HEENT:   Normal oral mucosa, PERRL, EOMI	  Lymphatic: No lymphadenopathy , no edema  Cardiovascular: Normal S1 S2, No JVD, No murmurs , Peripheral pulses palpable 2+ bilaterally  Respiratory: Lungs clear to auscultation, normal effort 	  Gastrointestinal:  Soft, Non-tender, + BS	      TELEMETRY: 	  Afib  	    ASSESSMENT/PLAN: 	82y Female with HTN, HLD who is being seen for new onset afib.     - Non valvular afib, good candidate for novel anticoagulation which ever is covered  - Patient would like to pursue outpatient stress this week in our office, which is reasonable given lack of anginal sxs and negative CE  - change Lopressor to toprol XL 25 mg PO daily upon D/C    Ayaan Delgadillo MD, FACC  [facility], [facility]  [address] [place] [address]  PHONE:  [phone]  BEEPER : [phone]

## 2017-10-09 NOTE — DISCHARGE NOTE ADULT - MEDICATION SUMMARY - MEDICATIONS TO TAKE
I will START or STAY ON the medications listed below when I get home from the hospital:    aspirin  -- Indication: For cardioprotective    Xarelto 20 mg oral tablet  -- 1 tab(s) by mouth once a day   -- Check with your doctor before becoming pregnant.  It is very important that you take or use this exactly as directed.  Do not skip doses or discontinue unless directed by your doctor.  Obtain medical advice before taking any non-prescription drugs as some may affect the action of this medication.  Take with food.    -- Indication: For Atrial fibrillation    Lipitor 40 mg oral tablet  -- 1 tab(s) by mouth once a day   -- Avoid grapefruit and grapefruit juice while taking this medication.  Do not take this drug if you are pregnant.  It is very important that you take or use this exactly as directed.  Do not skip doses or discontinue unless directed by your doctor.  Obtain medical advice before taking any non-prescription drugs as some may affect the action of this medication.  Take with food or milk.    -- Indication: For Hypercholesterolemia    Toprol-XL 25 mg oral tablet, extended release  -- 1 tab(s) by mouth once a day   -- It is very important that you take or use this exactly as directed.  Do not skip doses or discontinue unless directed by your doctor.  May cause drowsiness.  Alcohol may intensify this effect.  Use care when operating dangerous machinery.  Some non-prescription drugs may aggravate your condition.  Read all labels carefully.  If a warning appears, check with your doctor before taking.  Swallow whole.  Do not crush.  Take with food or milk.  This drug may impair the ability to drive or operate machinery.  Use care until you become familiar with its effects.    -- Indication: For HTN (hypertension)    Linzess 290 mcg oral capsule  -- 1 cap(s) by mouth once a day  -- Indication: For constipation

## 2017-10-09 NOTE — DISCHARGE NOTE ADULT - PROVIDER TOKENS
FREE:[LAST:[Leora],FIRST:[Titus],PHONE:[(   )    -],FAX:[(   )    -]],FREE:[LAST:[Leora],FIRST:[Titus],PHONE:[phone]],FAX:[(   )    -],ADDRESS:[[facility] [address] [facility] [address]]]

## 2017-10-09 NOTE — DISCHARGE NOTE ADULT - CARE PROVIDER_API CALL
Titus Corey  Phone: (   )    -  Fax: (   )    -    Titus Corey  [address], [place] [address]  Phone: [phone]  Fax: (   )    -

## 2017-10-09 NOTE — DISCHARGE NOTE ADULT - PATIENT PORTAL LINK FT
“You can access the [facility] Patient Portal, offered by [facility], by registering with the following website: [email or web address]”

## 2017-10-09 NOTE — DISCHARGE NOTE ADULT - CARE PLAN
Principal Discharge DX:	New onset atrial fibrillation  Goal:	avoid worsening of a fib  Instructions for follow-up, activity and diet:	You were seen and managed for your new onset a fib. You have clinically improved and are stable. Cardiology service Dr. Delgadillo was consulted. ECHO showed grossly normal LV ejection fraction. You can follow up with Dr. Delgadillo as outpatient for stress test.    Please continue medication XARELTO and follow up with PCP and cardiologist in 5 days for evaluation.  Secondary Diagnosis:	Essential hypertension  Goal:	BP<150/90  Instructions for follow-up, activity and diet:	Stable. Continue with medication and follow up with PCP/Cardiologist  Secondary Diagnosis:	Hypercholesterolemia  Instructions for follow-up, activity and diet:	Stable. Continue with medication and follow up with PCP/Cardiologist Principal Discharge DX:	New onset atrial fibrillation  Goal:	avoid worsening of a fib  Instructions for follow-up, activity and diet:	You were seen and managed for your new onset a fib. You have clinically improved and are stable. Cardiology service Dr. Delgadillo was consulted. ECHO showed grossly normal LV ejection fraction. You can follow up with Dr. Delgadillo as outpatient for stress test.  Please continue medication XARELTO and follow up with PCP and cardiologist in 5 days for evaluation.  Secondary Diagnosis:	Essential hypertension  Goal:	BP<150/90  Instructions for follow-up, activity and diet:	Stable. Continue with medication and follow up with PCP/Cardiologist  Secondary Diagnosis:	Hypercholesterolemia  Instructions for follow-up, activity and diet:	Stable. Continue with medication and follow up with PCP/Cardiologist

## 2017-10-09 NOTE — DISCHARGE NOTE ADULT - MEDICATION SUMMARY - MEDICATIONS TO STOP TAKING
I will STOP taking the medications listed below when I get home from the hospital:    Crestor    losartan    Coreg

## 2017-10-09 NOTE — DISCHARGE NOTE ADULT - PLAN OF CARE
avoid worsening of a fib You were seen and managed for your new onset a fib. You have clinically improved and are stable. Cardiology service Dr. Delgadillo was consulted. ECHO showed grossly normal LV ejection fraction. You can follow up with Dr. Delgadillo as outpatient for stress test.    Please continue medication XARELTO and follow up with PCP and cardiologist in 5 days for evaluation. BP<150/90 Stable. Continue with medication and follow up with PCP/Cardiologist You were seen and managed for your new onset a fib. You have clinically improved and are stable. Cardiology service Dr. Delgadillo was consulted. ECHO showed grossly normal LV ejection fraction. You can follow up with Dr. Delgadillo as outpatient for stress test.  Please continue medication XARELTO and follow up with PCP and cardiologist in 5 days for evaluation.

## 2017-10-09 NOTE — DISCHARGE NOTE ADULT - HOSPITAL COURSE
82 years old [place] speaking female from home lives alone, walks independently, no HHA, with PMH of HTN, HLD, and ?unknown tachyarrhythmia(however denies having h/o Afib) presented to the ED c/o palpations onset 2 days ago. Also endorses associated mild dizziness. Also notes, 3 days ago she drank small amount of ETOH however she does not consume EtOH daily. Denies chest pain, SOB, leg swelling, fever, chills, diarrhea, abdominal pain, or any other symptoms. Today she checked her BP and HR because of the symptoms, BP was 180/110 and HR was 153 so she decided to seek medical help. Last PMD check up was 4 months ago, all WNL per patient.     In ED, patient was tachycardic to 145, EKG showed new onset A flutter with HR of 132. s/p 20mg IV cardizem x2 times, PO 30mg cardizem once, HR subsided to 90's. SBP at 110-130's. Patient is admitted to the telemetry floor for new onset A flutter/fib requiring rate control and anticoagulation(CHADSVASC score 4).      Pt is now medically stable for discharge home, continue prescribed medications, and follow up with PCP/cardiologist in 5 days. 82 years old [place] speaking female from home lives alone, walks independently, no HHA, with PMH of HTN, HLD, and ?unknown tachyarrhythmia(however denies having h/o Afib) presented to the ED c/o palpations onset 2 days ago. Also endorses associated mild dizziness. Also notes, 3 days ago she drank small amount of ETOH however she does not consume EtOH daily. Denies chest pain, SOB, leg swelling, fever, chills, diarrhea, abdominal pain, or any other symptoms. Today she checked her BP and HR because of the symptoms, BP was 180/110 and HR was 153 so she decided to seek medical help. Last PMD check up was 4 months ago, all WNL per patient.     In ED, patient was tachycardic to 145, EKG showed new onset A flutter with HR of 132. s/p 20mg IV cardizem x2 times, PO 30mg cardizem once, HR subsided to 90's. SBP at 110-130's. Patient is admitted to the telemetry floor for new onset A flutter/fib requiring rate control and anticoagulation(CHADSVASC score 4).     Problem/Plan - 1:  ·  Problem: New onset atrial fibrillation.  Plan: -[id number] score 4  - started on heparin then switched to lovenox full dose  -D-dimer 216, PE less likely; for tachycardia  -Tropx3 neg  -Cardiology consult... Dr Delgadillo  -ECHO: Grossly normal LVEF  -started on lopressor... will d/c on toprol XL 25mg PO    -D-dimer 216, PE less likely; for tachycardia  -Monitor telemetry, trend cardiac enzymes(1st set negative)  -Cardiology consult  -check TTE  -Starting beta blocker for rate control; adjust dose based on the HR.   -Monitor BP and HR      Problem/Plan - 2:  ·  Problem: Essential hypertension.  Plan: -Currently BP stable  -discharge on toprol XL     Problem/Plan - 3:  ·  Problem: Hypercholesterolemia.  Plan: lipid panel wnl  -c/w lipitor 40mg     Problem/Plan - 4:  ·  Problem: Need for prophylactic measure.  Plan: -IMPROVE score is 2, patient was on full dose heparin and lovenox    Pt is now medically stable for discharge home, continue prescribed medications, and follow up with PCP/cardiologist in 5 days.

## 2017-10-12 DIAGNOSIS — E78.5 HYPERLIPIDEMIA, UNSPECIFIED: ICD-10-CM

## 2017-10-12 DIAGNOSIS — I48.91 UNSPECIFIED ATRIAL FIBRILLATION: ICD-10-CM

## 2017-10-12 DIAGNOSIS — I10 ESSENTIAL (PRIMARY) HYPERTENSION: ICD-10-CM

## 2020-07-06 NOTE — DISCHARGE NOTE ADULT - NS AS DC FOLLOWUP INST YN
Return immediately to the Emergency Department if you experience continuing or worsening discomfort in your chest, any difficulty breathing, back pain, abdominal pain, or any other new or worsening symptoms.      
Yes

## 2022-09-11 NOTE — ED ADULT NURSE NOTE - NS ED NURSE LEVEL OF CONSCIOUSNESS AFFECT
[FreeTextEntry1] : Entered by KATIE DE LA ROSA, acting as scribe for Dr. Orlando Lewis.\par The documentation recorded by the scribe accurately reflects the service I personally performed and the decisions made by me. 
Anxious